# Patient Record
Sex: FEMALE | Race: OTHER | NOT HISPANIC OR LATINO | Employment: STUDENT | URBAN - METROPOLITAN AREA
[De-identification: names, ages, dates, MRNs, and addresses within clinical notes are randomized per-mention and may not be internally consistent; named-entity substitution may affect disease eponyms.]

---

## 2023-07-20 ENCOUNTER — OFFICE VISIT (OUTPATIENT)
Dept: PRIMARY CARE | Facility: CLINIC | Age: 18
End: 2023-07-20
Payer: COMMERCIAL

## 2023-07-20 VITALS
WEIGHT: 146 LBS | HEART RATE: 80 BPM | OXYGEN SATURATION: 99 % | SYSTOLIC BLOOD PRESSURE: 100 MMHG | HEIGHT: 64 IN | BODY MASS INDEX: 24.92 KG/M2 | DIASTOLIC BLOOD PRESSURE: 60 MMHG

## 2023-07-20 DIAGNOSIS — Z00.00 ANNUAL PHYSICAL EXAM: Primary | ICD-10-CM

## 2023-07-20 PROCEDURE — 1036F TOBACCO NON-USER: CPT | Performed by: FAMILY MEDICINE

## 2023-07-20 PROCEDURE — 99395 PREV VISIT EST AGE 18-39: CPT | Performed by: FAMILY MEDICINE

## 2023-07-20 RX ORDER — CETIRIZINE HYDROCHLORIDE 10 MG/1
10 TABLET ORAL
COMMUNITY
Start: 2020-07-02

## 2023-07-20 NOTE — PROGRESS NOTES
"Subjective   Ashley Tolentino is a 17 y.o. female here to establish care and for a comprehensive physical exam. The patient reports no problems.    Do you take any herbs or supplements that were not prescribed by a doctor? no  Are you taking calcium supplements? no  Are you taking aspirin daily? no    SOC Hx:  [facility] - public health , looking into PA programs  Tobacco Use: Low Risk  (2023)    Patient History     Smoking Tobacco Use: Never     Smokeless Tobacco Use: Never     Passive Exposure: Not on file     Alcohol Use: Not on file     DIET: Vegetarian - protein sources: Tofu, legumes, whey and plant based proteins greek yogurt    EXERCISE: weight training 6 days per week    ELIMINATION: no constipation or diarrhea    No urinary issues    SLEEP: no issues    MOODS: no concerning symptoms  PHQ-9:    ARNEL-7:       OB/GYN:   No known family history of breast cancer.  Menstrual cycles - regular       History:  LMP: No LMP recorded.  Last pap date: n/a  : 0  Para: 0    Review of Systems   Review of Systems negative except as noted in HPI and Chief complaint.     Objective     /60 (BP Location: Left arm, Patient Position: Sitting, BP Cuff Size: Adult)   Pulse 80   Ht 1.626 m (5' 4\")   Wt 66.2 kg (146 lb)   SpO2 99%   BMI 25.06 kg/m²      Physical Exam    Assessment/Plan   Healthy female exam.      1. Please have labs drawn at your earliest convenience.  You should fast 12 hours prior to arriving at the lab - during these 12 hours you may have water, black coffee, black tea - nothing with cream or sugar and no solid foods.    Our office will contact you with results after they have been reviewed.  Please allow 48 - 72 hours for most results, some may take longer.  Please keep in mind that results may post immediately to your online portal, even before we have a chance to review them.  Once we have had the opportunity to review we will post comments and have our staff contact you with results and any " instructions.  Please call our office if you do not hear from our office in 7 days.     2. Patient Counseling:  --Nutrition: Stressed importance of moderation in sodium/caffeine intake, saturated fat and cholesterol, caloric balance, sufficient intake of fresh fruits, vegetables, fiber, calcium, iron, and 1 mg of folate supplement per day (for females capable of pregnancy).  --Discussed the issue of estrogen replacement, calcium supplement, and the daily use of baby aspirin.  --Exercise: Stressed the importance of regular exercise.   --Substance Abuse: Discussed cessation/primary prevention of tobacco, alcohol, or other drug use; driving or other dangerous activities under the influence; availability of treatment for abuse.    --Sexuality: Discussed sexually transmitted diseases, partner selection, use of condoms, avoidance of unintended pregnancy  and contraceptive alternatives.   --Injury prevention: Discussed safety belts, safety helmets, smoke detector, smoking near bedding or upholstery.   --Dental health: Discussed importance of regular tooth brushing, flossing, and dental visits.  --Immunizations reviewed.  --Discussed benefits of screening colonoscopy.  --After hours service discussed with patient  3. Discussed the patient's BMI with her.  The BMI is in the acceptable range.    Call with any questions or concerns.    Follow up next physical in 1 year

## 2023-07-24 ENCOUNTER — LAB (OUTPATIENT)
Dept: LAB | Facility: LAB | Age: 18
End: 2023-07-24
Payer: COMMERCIAL

## 2023-07-24 DIAGNOSIS — Z00.00 ANNUAL PHYSICAL EXAM: ICD-10-CM

## 2023-07-24 LAB
ALANINE AMINOTRANSFERASE (SGPT) (U/L) IN SER/PLAS: 16 U/L (ref 7–45)
ALBUMIN (G/DL) IN SER/PLAS: 4.6 G/DL (ref 3.4–5)
ALKALINE PHOSPHATASE (U/L) IN SER/PLAS: 56 U/L (ref 33–110)
ANION GAP IN SER/PLAS: 14 MMOL/L (ref 10–20)
ASPARTATE AMINOTRANSFERASE (SGOT) (U/L) IN SER/PLAS: 18 U/L (ref 9–39)
BILIRUBIN TOTAL (MG/DL) IN SER/PLAS: 0.9 MG/DL (ref 0–1.2)
CALCIUM (MG/DL) IN SER/PLAS: 9.6 MG/DL (ref 8.6–10.6)
CARBON DIOXIDE, TOTAL (MMOL/L) IN SER/PLAS: 24 MMOL/L (ref 21–32)
CHLORIDE (MMOL/L) IN SER/PLAS: 104 MMOL/L (ref 98–107)
CHOLESTEROL (MG/DL) IN SER/PLAS: 118 MG/DL (ref 0–199)
CHOLESTEROL IN HDL (MG/DL) IN SER/PLAS: 52 MG/DL
CHOLESTEROL/HDL RATIO: 2.3
CREATININE (MG/DL) IN SER/PLAS: 0.67 MG/DL (ref 0.5–1.05)
ERYTHROCYTE DISTRIBUTION WIDTH (RATIO) BY AUTOMATED COUNT: 12.1 % (ref 11.5–14.5)
ERYTHROCYTE MEAN CORPUSCULAR HEMOGLOBIN CONCENTRATION (G/DL) BY AUTOMATED: 33.5 G/DL (ref 32–36)
ERYTHROCYTE MEAN CORPUSCULAR VOLUME (FL) BY AUTOMATED COUNT: 89 FL (ref 80–100)
ERYTHROCYTES (10*6/UL) IN BLOOD BY AUTOMATED COUNT: 4.76 X10E12/L (ref 4–5.2)
GFR FEMALE: >90 ML/MIN/1.73M2
GLUCOSE (MG/DL) IN SER/PLAS: 89 MG/DL (ref 74–99)
HEMATOCRIT (%) IN BLOOD BY AUTOMATED COUNT: 42.4 % (ref 36–46)
HEMOGLOBIN (G/DL) IN BLOOD: 14.2 G/DL (ref 12–16)
LDL: 57 MG/DL (ref 0–109)
LEUKOCYTES (10*3/UL) IN BLOOD BY AUTOMATED COUNT: 4 X10E9/L (ref 4.4–11.3)
NON HDL CHOLESTEROL: 66 MG/DL (ref 0–119)
NRBC (PER 100 WBCS) BY AUTOMATED COUNT: 0 /100 WBC (ref 0–0)
PLATELETS (10*3/UL) IN BLOOD AUTOMATED COUNT: 304 X10E9/L (ref 150–450)
POTASSIUM (MMOL/L) IN SER/PLAS: 4.4 MMOL/L (ref 3.5–5.3)
PROTEIN TOTAL: 7.1 G/DL (ref 6.4–8.2)
SODIUM (MMOL/L) IN SER/PLAS: 138 MMOL/L (ref 136–145)
THYROTROPIN (MIU/L) IN SER/PLAS BY DETECTION LIMIT <= 0.05 MIU/L: 0.82 MIU/L (ref 0.44–3.98)
TRIGLYCERIDE (MG/DL) IN SER/PLAS: 45 MG/DL (ref 0–149)
UREA NITROGEN (MG/DL) IN SER/PLAS: 16 MG/DL (ref 6–23)
VLDL: 9 MG/DL (ref 0–40)

## 2023-07-24 PROCEDURE — 84443 ASSAY THYROID STIM HORMONE: CPT

## 2023-07-24 PROCEDURE — 80053 COMPREHEN METABOLIC PANEL: CPT

## 2023-07-24 PROCEDURE — 36415 COLL VENOUS BLD VENIPUNCTURE: CPT

## 2023-07-24 PROCEDURE — 85027 COMPLETE CBC AUTOMATED: CPT

## 2023-07-24 PROCEDURE — 80061 LIPID PANEL: CPT

## 2023-08-14 ENCOUNTER — OFFICE VISIT (OUTPATIENT)
Dept: PRIMARY CARE | Facility: CLINIC | Age: 18
End: 2023-08-14
Payer: COMMERCIAL

## 2023-08-14 VITALS
HEART RATE: 75 BPM | HEIGHT: 64 IN | OXYGEN SATURATION: 98 % | BODY MASS INDEX: 24.24 KG/M2 | DIASTOLIC BLOOD PRESSURE: 70 MMHG | WEIGHT: 142 LBS | SYSTOLIC BLOOD PRESSURE: 110 MMHG

## 2023-08-14 DIAGNOSIS — G44.209 ACUTE NON INTRACTABLE TENSION-TYPE HEADACHE: Primary | ICD-10-CM

## 2023-08-14 PROCEDURE — 1036F TOBACCO NON-USER: CPT | Performed by: FAMILY MEDICINE

## 2023-08-14 PROCEDURE — 99213 OFFICE O/P EST LOW 20 MIN: CPT | Performed by: FAMILY MEDICINE

## 2023-08-14 RX ORDER — RIZATRIPTAN BENZOATE 10 MG/1
10 TABLET ORAL ONCE AS NEEDED
Qty: 9 TABLET | Refills: 0 | Status: SHIPPED | OUTPATIENT
Start: 2023-08-14 | End: 2023-08-17 | Stop reason: SDUPTHER

## 2023-08-14 ASSESSMENT — ENCOUNTER SYMPTOMS
DEPRESSION: 0
LOSS OF SENSATION IN FEET: 0
OCCASIONAL FEELINGS OF UNSTEADINESS: 0

## 2023-08-14 NOTE — PROGRESS NOTES
"Subjective   Patient ID: Ashley Tolentino is a 17 y.o. female who presents for Headache (She has been on and off \"pressure in her head\" for the last few weeks on and off, she states that she is having dizziness and nausea with it. She did take ibuprofen and it seemed to help a little bit. ).  HPI    HEADACHES: for the last 1 week or so  Started with dizziness and spinning sensation and things would \"go black \" and last a few seconds    No recent illness, no new medications or supplements.  No recent travel    Urine coloration same, light yellow  She does drink a large amount of water regularly    Started with headaches around the same time  Start in bilateral temples and moves backwards to occipital region  Last all day - taking otc Advil and helps somewhat    Pain averages 5/10  Motrin takes to 3/10    Review of Systems    Review of Systems negative except as noted in HPI and Chief complaint.     Objective               Physical Exam  Constitutional:       General: She is not in acute distress.     Appearance: Normal appearance. She is not ill-appearing.   HENT:      Head: Normocephalic and atraumatic.   Neck:      Vascular: No carotid bruit.   Cardiovascular:      Rate and Rhythm: Normal rate and regular rhythm.      Pulses: Normal pulses.      Heart sounds: Normal heart sounds. No murmur heard.     No gallop.   Pulmonary:      Effort: Pulmonary effort is normal.      Breath sounds: Normal breath sounds. No wheezing, rhonchi or rales.   Musculoskeletal:      Cervical back: Normal range of motion and neck supple. No rigidity or tenderness.   Lymphadenopathy:      Cervical: No cervical adenopathy.   Skin:     General: Skin is warm and dry.   Neurological:      Mental Status: She is alert.   Psychiatric:         Mood and Affect: Mood normal.         Behavior: Behavior normal.       /70 (BP Location: Left arm, Patient Position: Sitting, BP Cuff Size: Adult)   Pulse 75   Ht 1.626 m (5' 4\")   Wt 64.4 kg (142 lb)  "  SpO2 98%   BMI 24.37 kg/m²      Assessment/Plan   Problem List Items Addressed This Visit    None  Visit Diagnoses       Acute non intractable tension-type headache    -  Primary    Relevant Medications    rizatriptan (Maxalt) 10 mg tablet        Possible migraine variant - please track incidence and relation to menstrual cycles  Follow up if not improved.

## 2023-08-17 DIAGNOSIS — G44.209 ACUTE NON INTRACTABLE TENSION-TYPE HEADACHE: ICD-10-CM

## 2023-08-17 NOTE — TELEPHONE ENCOUNTER
Patient's mother called, patient is leaving for school tomorrow but needs a refill of rizatriptan 10 mg called into [facility] on [address].  Last OV 8/14/2023.

## 2023-08-18 RX ORDER — RIZATRIPTAN BENZOATE 10 MG/1
10 TABLET ORAL ONCE AS NEEDED
Qty: 9 TABLET | Refills: 0 | Status: SHIPPED | OUTPATIENT
Start: 2023-08-18 | End: 2023-09-17

## 2024-05-24 ENCOUNTER — OFFICE VISIT (OUTPATIENT)
Dept: PRIMARY CARE | Facility: CLINIC | Age: 19
End: 2024-05-24
Payer: COMMERCIAL

## 2024-05-24 VITALS
DIASTOLIC BLOOD PRESSURE: 80 MMHG | BODY MASS INDEX: 23.9 KG/M2 | HEART RATE: 78 BPM | SYSTOLIC BLOOD PRESSURE: 120 MMHG | OXYGEN SATURATION: 98 % | HEIGHT: 64 IN | WEIGHT: 140 LBS

## 2024-05-24 DIAGNOSIS — F41.9 ANXIETY: Primary | ICD-10-CM

## 2024-05-24 PROCEDURE — 99213 OFFICE O/P EST LOW 20 MIN: CPT | Performed by: FAMILY MEDICINE

## 2024-05-24 RX ORDER — ESCITALOPRAM OXALATE 10 MG/1
10 TABLET ORAL DAILY
Qty: 30 TABLET | Refills: 1 | Status: SHIPPED | OUTPATIENT
Start: 2024-05-24 | End: 2024-11-20

## 2024-05-24 ASSESSMENT — ANXIETY QUESTIONNAIRES
3. WORRYING TOO MUCH ABOUT DIFFERENT THINGS: SEVERAL DAYS
6. BECOMING EASILY ANNOYED OR IRRITABLE: MORE THAN HALF THE DAYS
GAD7 TOTAL SCORE: 10
2. NOT BEING ABLE TO STOP OR CONTROL WORRYING: SEVERAL DAYS
1. FEELING NERVOUS, ANXIOUS, OR ON EDGE: MORE THAN HALF THE DAYS
5. BEING SO RESTLESS THAT IT IS HARD TO SIT STILL: SEVERAL DAYS
IF YOU CHECKED OFF ANY PROBLEMS ON THIS QUESTIONNAIRE, HOW DIFFICULT HAVE THESE PROBLEMS MADE IT FOR YOU TO DO YOUR WORK, TAKE CARE OF THINGS AT HOME, OR GET ALONG WITH OTHER PEOPLE: SOMEWHAT DIFFICULT
4. TROUBLE RELAXING: MORE THAN HALF THE DAYS
7. FEELING AFRAID AS IF SOMETHING AWFUL MIGHT HAPPEN: SEVERAL DAYS

## 2024-05-24 ASSESSMENT — PATIENT HEALTH QUESTIONNAIRE - PHQ9
9. THOUGHTS THAT YOU WOULD BE BETTER OFF DEAD, OR OF HURTING YOURSELF: NOT AT ALL
SUM OF ALL RESPONSES TO PHQ9 QUESTIONS 1 AND 2: 2
5. POOR APPETITE OR OVEREATING: SEVERAL DAYS
1. LITTLE INTEREST OR PLEASURE IN DOING THINGS: SEVERAL DAYS
4. FEELING TIRED OR HAVING LITTLE ENERGY: MORE THAN HALF THE DAYS
8. MOVING OR SPEAKING SO SLOWLY THAT OTHER PEOPLE COULD HAVE NOTICED. OR THE OPPOSITE, BEING SO FIGETY OR RESTLESS THAT YOU HAVE BEEN MOVING AROUND A LOT MORE THAN USUAL: SEVERAL DAYS
SUM OF ALL RESPONSES TO PHQ QUESTIONS 1-9: 8
6. FEELING BAD ABOUT YOURSELF - OR THAT YOU ARE A FAILURE OR HAVE LET YOURSELF OR YOUR FAMILY DOWN: NOT AT ALL
2. FEELING DOWN, DEPRESSED OR HOPELESS: SEVERAL DAYS
10. IF YOU CHECKED OFF ANY PROBLEMS, HOW DIFFICULT HAVE THESE PROBLEMS MADE IT FOR YOU TO DO YOUR WORK, TAKE CARE OF THINGS AT HOME, OR GET ALONG WITH OTHER PEOPLE: SOMEWHAT DIFFICULT
7. TROUBLE CONCENTRATING ON THINGS, SUCH AS READING THE NEWSPAPER OR WATCHING TELEVISION: SEVERAL DAYS
3. TROUBLE FALLING OR STAYING ASLEEP OR SLEEPING TOO MUCH: SEVERAL DAYS

## 2024-05-24 NOTE — PROGRESS NOTES
"Subjective   Patient ID: Ashley Tolentino is a 18 y.o. female who presents for Follow-up (Whole body twitching- more at night, but does happen during the day x 1 month. ).    HPI    TWITCHING:  Eye twitching usually in the afternoon in evening  Started a month or so ago  Admittedly increased stressors with school and not feeling rested.    Leg and  foot \"twitching\" involuntarily  Seems random not only at nght.  NO changes in routine or medications/supplements.    Noticed about 1-2 months ago   Episodes random    ANXIETY: seems worsening  Disruptive sleep due to worrying and  scheduling    Having anxiety attacks with racing heart and  difficulty catching her breath \"feels like I can't get enough air    Anxiety attacks can happen a few times per week.    PHQ-9: 2  ARNEL-7 Total Score: 10       Review of Systems    Review of Systems negative except as noted in HPI and Chief complaint.     Objective   Patient Health Questionnaire-9 Score: 8     ARNEL-7 Total Score: 10     VITALS:  /80 (BP Location: Left arm, Patient Position: Sitting, BP Cuff Size: Adult)   Pulse 78   Ht 1.626 m (5' 4\")   Wt 63.5 kg (140 lb)   SpO2 98%   BMI 24.03 kg/m²      Physical Exam  Constitutional:       General: She is not in acute distress.     Appearance: Normal appearance. She is not ill-appearing.   HENT:      Head: Normocephalic and atraumatic.   Neck:      Vascular: No carotid bruit.   Cardiovascular:      Rate and Rhythm: Normal rate and regular rhythm.      Pulses: Normal pulses.      Heart sounds: Normal heart sounds. No murmur heard.     No gallop.   Pulmonary:      Effort: Pulmonary effort is normal.      Breath sounds: Normal breath sounds. No wheezing, rhonchi or rales.   Musculoskeletal:      Cervical back: Normal range of motion and neck supple. No rigidity or tenderness.   Lymphadenopathy:      Cervical: No cervical adenopathy.   Skin:     General: Skin is warm and dry.   Neurological:      Mental Status: She is alert. "   Psychiatric:         Mood and Affect: Mood normal.         Behavior: Behavior normal.      Comments: Anxious mood - good eye contact and appropriate eye contact  Verbalizes understanding of symptoms and care plan         Assessment/Plan   Problem List Items Addressed This Visit       Anxiety - Primary     Trial of Escitalopram.  Reviewed risks, side effects and expected treatment course of medications.  Call with any problems or concerns.   Follow up with virtual appointment in 4-6 weeks.    Consider referral to Behavioral Health Collaborative Care Team.         Relevant Medications    escitalopram (Lexapro) 10 mg tablet       FOLLOW UP 4-6 weeks, SOONER WITH ANY PROBLEMS OR CONCERNS.

## 2024-05-25 PROBLEM — F41.9 ANXIETY: Status: ACTIVE | Noted: 2024-05-25

## 2024-05-25 NOTE — ASSESSMENT & PLAN NOTE
Trial of Escitalopram.  Reviewed risks, side effects and expected treatment course of medications.  Call with any problems or concerns.   Follow up with virtual appointment in 4-6 weeks.    Consider referral to Behavioral Health Collaborative Care Team.

## 2024-06-20 ENCOUNTER — APPOINTMENT (OUTPATIENT)
Dept: PRIMARY CARE | Facility: CLINIC | Age: 19
End: 2024-06-20
Payer: COMMERCIAL

## 2024-07-21 DIAGNOSIS — F41.9 ANXIETY: ICD-10-CM

## 2024-07-22 RX ORDER — ESCITALOPRAM OXALATE 10 MG/1
10 TABLET ORAL DAILY
Qty: 30 TABLET | Refills: 0 | Status: SHIPPED | OUTPATIENT
Start: 2024-07-22

## 2024-08-19 DIAGNOSIS — F41.9 ANXIETY: ICD-10-CM

## 2024-08-27 NOTE — TELEPHONE ENCOUNTER
Patient called in and left a VM for a refill on her Lexapro, she is over due for an appointment, she will need to schedule. I called and left her a message to call the office back and we can get her a short term RX.

## 2024-08-29 RX ORDER — ESCITALOPRAM OXALATE 10 MG/1
10 TABLET ORAL DAILY
Qty: 30 TABLET | Refills: 0 | Status: SHIPPED | OUTPATIENT
Start: 2024-08-29

## 2024-08-29 NOTE — TELEPHONE ENCOUNTER
RETURNED PATIENT VOICEMAIL AND LEFT MESSAGE TO CALL OFFICE AND SCHEDULE APPOINTMENT AND THEN A SHORT SUPPLY WILL BE SENT

## 2024-08-29 NOTE — TELEPHONE ENCOUNTER
Pt called back and made an appt for a VV on 9/27 since she is at school in [place]. Can you send her in a short supply to the [facility] in [place]?

## 2024-09-27 ENCOUNTER — APPOINTMENT (OUTPATIENT)
Dept: PRIMARY CARE | Facility: CLINIC | Age: 19
End: 2024-09-27
Payer: COMMERCIAL

## 2024-09-27 DIAGNOSIS — F41.9 ANXIETY: Primary | ICD-10-CM

## 2024-09-27 DIAGNOSIS — M62.838 MUSCLE SPASM: ICD-10-CM

## 2024-09-27 DIAGNOSIS — F41.8 SITUATIONAL ANXIETY: ICD-10-CM

## 2024-09-27 DIAGNOSIS — Z00.00 ANNUAL PHYSICAL EXAM: ICD-10-CM

## 2024-09-27 PROCEDURE — 1036F TOBACCO NON-USER: CPT | Performed by: FAMILY MEDICINE

## 2024-09-27 PROCEDURE — 99214 OFFICE O/P EST MOD 30 MIN: CPT | Performed by: FAMILY MEDICINE

## 2024-09-27 RX ORDER — ESCITALOPRAM OXALATE 10 MG/1
10 TABLET ORAL DAILY
Qty: 90 TABLET | Refills: 1 | Status: SHIPPED | OUTPATIENT
Start: 2024-09-27

## 2024-09-27 RX ORDER — PROPRANOLOL HYDROCHLORIDE 10 MG/1
10-20 TABLET ORAL 2 TIMES DAILY PRN
Qty: 60 TABLET | Refills: 0 | Status: SHIPPED | OUTPATIENT
Start: 2024-09-27 | End: 2025-09-27

## 2024-09-27 NOTE — PROGRESS NOTES
"Subjective   Patient ID: Ashley Tolentino is a 19 y.o. female who presents for No chief complaint on file..    HPI    This visit was completed via  [email or web address]'s virtual platform.  All issues as below were discussed and addressed but no physical exam was performed.  If it was felt that the patient should be evaluated in clinic then they were directed there.  The patient verbally consented to visit.    Provider was located at our main office [address] in  [facility] and patient was located at a remote facility in [place].    School going well- mid-terms went well    Taking Lexapro regularly - less eye twitching  Panic attacks with some breakthrough  Increased sweating and heart racing and \"dropping\"  Usually situational    Eye twitching better but leg and tricep twitching  Taking Magnesium regularly      Review of Systems    Review of Systems negative except as noted in HPI and Chief complaint.     Objective             VITALS:  There were no vitals taken for this visit.     Physical Exam    Assessment/Plan   Problem List Items Addressed This Visit       Anxiety - Primary     Much improved with Escitalopram.   Refilling at current dosage.  Trial of prn Propranolol for situational anxiety.  Reviewed risks, side effects and expected treatment course of medications.  Call with any problems or concerns.   Follow up 6 months.         Relevant Medications    escitalopram (Lexapro) 10 mg tablet     Other Visit Diagnoses       Situational anxiety        Propranolol for prn use.    Relevant Medications    propranolol (Inderal) 10 mg tablet    Muscle spasm        Increased fluids, electrolyte replacement.  Checking labs as below - will call with results.    Relevant Orders    Comprehensive metabolic panel    TSH with reflex to Free T4 if abnormal    CBC    Magnesium    Annual physical exam                FOLLOW UP 6 MONTHS, SOONER WITH ANY PROBLEMS OR CONCERNS.    If needing refills will send to local " pharmacy.  Follow up for annual physical over the summer.

## 2024-09-28 NOTE — ASSESSMENT & PLAN NOTE
Much improved with Escitalopram.   Refilling at current dosage.  Trial of prn Propranolol for situational anxiety.  Reviewed risks, side effects and expected treatment course of medications.  Call with any problems or concerns.   Follow up 6 months.

## 2024-11-06 DIAGNOSIS — F41.8 SITUATIONAL ANXIETY: ICD-10-CM

## 2024-11-08 RX ORDER — PROPRANOLOL HYDROCHLORIDE 10 MG/1
10-20 TABLET ORAL 2 TIMES DAILY PRN
Qty: 60 TABLET | Refills: 0 | Status: SHIPPED | OUTPATIENT
Start: 2024-11-08 | End: 2025-11-08

## 2024-12-18 DIAGNOSIS — F41.8 SITUATIONAL ANXIETY: ICD-10-CM

## 2024-12-20 RX ORDER — PROPRANOLOL HYDROCHLORIDE 10 MG/1
10-20 TABLET ORAL 2 TIMES DAILY PRN
Qty: 120 TABLET | Refills: 0 | Status: SHIPPED | OUTPATIENT
Start: 2024-12-20

## 2024-12-20 NOTE — TELEPHONE ENCOUNTER
Follow-up visit: none - due by April  Last visit: 9/27/24 rtc in 6 months    Last vitals: 5/24/24 (/80, HR 78)  Will send in 120 tabs for at least 30 day if pt is taking 2 tabs BID. Propranolol 10mg - 1-2 tabs BID prn anxiety was started on 9/27/24 for trial and to follow-up in 6 months which is due by April

## 2025-03-28 DIAGNOSIS — F41.9 ANXIETY: ICD-10-CM

## 2025-03-28 RX ORDER — ESCITALOPRAM OXALATE 10 MG/1
10 TABLET ORAL DAILY
Qty: 30 TABLET | Refills: 1 | Status: SHIPPED | OUTPATIENT
Start: 2025-03-28

## 2025-03-28 NOTE — TELEPHONE ENCOUNTER
Can this patient get a short supply until her next vst ?  Medication Name:ecitalopram (Lexapro)  Dose: 10 mg   Frequency: Daily   Quantity left: 5  tbs     Pharmacy:[facility]/pharmacy #[id number] - [facility] [address] [facility] AT [address] [address]        Last appointment: 9/27/2024   Last CPE: 7/20/23   Last MCW: N/A  Next appointment:5/13/2025   Next CPE: 7/11/2025   Next MCW: N/A

## 2025-05-13 ENCOUNTER — APPOINTMENT (OUTPATIENT)
Dept: PRIMARY CARE | Facility: CLINIC | Age: 20
End: 2025-05-13
Payer: COMMERCIAL

## 2025-05-13 DIAGNOSIS — F41.8 SITUATIONAL ANXIETY: ICD-10-CM

## 2025-05-13 DIAGNOSIS — F41.9 ANXIETY: ICD-10-CM

## 2025-05-13 PROCEDURE — 99213 OFFICE O/P EST LOW 20 MIN: CPT | Performed by: FAMILY MEDICINE

## 2025-05-13 PROCEDURE — 1036F TOBACCO NON-USER: CPT | Performed by: FAMILY MEDICINE

## 2025-05-13 RX ORDER — PROPRANOLOL HYDROCHLORIDE 10 MG/1
10-20 TABLET ORAL 2 TIMES DAILY PRN
Qty: 120 TABLET | Refills: 1 | Status: SHIPPED | OUTPATIENT
Start: 2025-05-13

## 2025-05-13 RX ORDER — ESCITALOPRAM OXALATE 10 MG/1
10 TABLET ORAL DAILY
Qty: 90 TABLET | Refills: 3 | Status: SHIPPED | OUTPATIENT
Start: 2025-05-13

## 2025-05-13 ASSESSMENT — LIFESTYLE VARIABLES
HOW OFTEN DO YOU HAVE SIX OR MORE DRINKS ON ONE OCCASION: NEVER
SKIP TO QUESTIONS 9-10: 1
HOW MANY STANDARD DRINKS CONTAINING ALCOHOL DO YOU HAVE ON A TYPICAL DAY: 1 OR 2
HOW OFTEN DO YOU HAVE A DRINK CONTAINING ALCOHOL: MONTHLY OR LESS
AUDIT-C TOTAL SCORE: 1

## 2025-05-13 NOTE — ASSESSMENT & PLAN NOTE
Generalized anxiety disorder is well-managed with escitalopram 10 mg daily. Propranolol is effective for situational anxiety during events such as tests or presentations.  - Send refill for escitalopram 10 mg to the pharmacy on [address] for a 90-day supply.  - Send refill for propranolol to the pharmacy on [address] for a 90-day supply.  Orders:    escitalopram (Lexapro) 10 mg tablet; Take 1 tablet (10 mg) by mouth once daily.

## 2025-05-13 NOTE — PROGRESS NOTES
Subjective     Patient ID: Ashley Tolentino is a 19 y.o. female who presents for Med Refill.    HPI     Virtual or Telephone Consent    An interactive audio and video telecommunication system which permits real time communications between the patient (at the originating site) and provider (at the distant site) was utilized to provide this telehealth service.   Verbal consent was requested and obtained from Ashley Tolentino on this date, 05/13/25 for a telehealth visit and the patient's location was confirmed at the time of the visit.     History of Present Illness  Ashley Tolentino is a 19 year old female who presents for medication refill.    She is currently taking escitalopram 10 mg daily for anxiety, which effectively manages her symptoms. She also uses propranolol situationally, such as during tests or presentations, and finds it effective in preventing anxiety attacks. She has run out of refills for propranolol.    She no longer needs to take Maxalt (rizatriptan) for headaches, as she has resolved.    Her sleep and appetite are good, and she has no current stomach issues.    She is currently taking summer classes, including biology and psychology, and is residing in [place] for the summer.    Review of Systems  Review of Systems negative except as noted in HPI and Chief complaint.     Objective     VITALS:  There were no vitals taken for this visit.    Physical Exam    Assessment & Plan  Anxiety  Generalized anxiety disorder is well-managed with escitalopram 10 mg daily. Propranolol is effective for situational anxiety during events such as tests or presentations.  - Send refill for escitalopram 10 mg to the pharmacy on [address] for a 90-day supply.  - Send refill for propranolol to the pharmacy on [address] for a 90-day supply.  Orders:    escitalopram (Lexapro) 10 mg tablet; Take 1 tablet (10 mg) by mouth once daily.    Situational anxiety    Orders:    propranolol (Inderal) 10 mg tablet; Take 1-2 tablets  (10-20 mg) by mouth 2 times a day as needed (anxiety).         Assessment & Plan  Migraine  Migraine is currently not an active issue. Rizatriptan is no longer needed as headaches have resolved.    Recording duration: 3 minutes         FOLLOW UP CPE due in this summer    Leila Sims, DO 05/13/25 2:00 PM    This medical note was created with the assistance of artificial intelligence (AI) for documentation purposes. The content has been reviewed and confirmed by the healthcare provider for accuracy and completeness. Patient consented to the use of audio recording and use of AI during their visit.

## 2025-07-11 ENCOUNTER — APPOINTMENT (OUTPATIENT)
Dept: PRIMARY CARE | Facility: CLINIC | Age: 20
End: 2025-07-11
Payer: COMMERCIAL

## 2025-09-19 ENCOUNTER — APPOINTMENT (OUTPATIENT)
Dept: PRIMARY CARE | Facility: CLINIC | Age: 20
End: 2025-09-19
Payer: COMMERCIAL